# Patient Record
Sex: MALE | Race: WHITE | NOT HISPANIC OR LATINO | URBAN - METROPOLITAN AREA
[De-identification: names, ages, dates, MRNs, and addresses within clinical notes are randomized per-mention and may not be internally consistent; named-entity substitution may affect disease eponyms.]

---

## 2019-05-03 ENCOUNTER — EMERGENCY (EMERGENCY)
Facility: HOSPITAL | Age: 24
LOS: 1 days | Discharge: HOME | End: 2019-05-03
Attending: EMERGENCY MEDICINE | Admitting: EMERGENCY MEDICINE
Payer: COMMERCIAL

## 2019-05-03 VITALS
OXYGEN SATURATION: 97 % | RESPIRATION RATE: 18 BRPM | TEMPERATURE: 98 F | HEART RATE: 80 BPM | DIASTOLIC BLOOD PRESSURE: 78 MMHG | SYSTOLIC BLOOD PRESSURE: 124 MMHG

## 2019-05-03 DIAGNOSIS — M79.644 PAIN IN RIGHT FINGER(S): ICD-10-CM
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
## 2019-05-03 DIAGNOSIS — S67.192A CRUSHING INJURY OF RIGHT MIDDLE FINGER, INITIAL ENCOUNTER: ICD-10-CM
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
## 2019-05-03 PROCEDURE — 73140 X-RAY EXAM OF FINGER(S): CPT | Mod: 26,RT

## 2019-05-03 PROCEDURE — 73130 X-RAY EXAM OF HAND: CPT | Mod: 26,RT

## 2019-05-03 PROCEDURE — 99283 EMERGENCY DEPT VISIT LOW MDM: CPT | Mod: 25

## 2019-05-03 RX ORDER — CEFUROXIME AXETIL 250 MG
500 TABLET ORAL ONCE
Qty: 0 | Refills: 0 | Status: COMPLETED | OUTPATIENT
Start: 2019-05-03 | End: 2019-05-03

## 2019-05-03 RX ORDER — IBUPROFEN 200 MG
1 TABLET ORAL
Qty: 30 | Refills: 0 | OUTPATIENT
Start: 2019-05-03

## 2019-05-03 RX ORDER — IBUPROFEN 200 MG
600 TABLET ORAL ONCE
Qty: 0 | Refills: 0 | Status: COMPLETED | OUTPATIENT
Start: 2019-05-03 | End: 2019-05-03

## 2019-05-03 RX ADMIN — Medication 600 MILLIGRAM(S): at 10:53

## 2019-05-03 RX ADMIN — Medication 500 MILLIGRAM(S): at 10:53

## 2019-05-03 NOTE — ED PROVIDER NOTE - CARE PROVIDER_API CALL
Robel Abbasi)  Plastic Surgery; Surgery  [address] [place] [id number]  Phone: [phone]  Fax: [phone]  Follow Up Time:     Heath Awad)  Orthopaedic Surgery  [address] [place] [address]  Phone: [phone]  Fax: [phone]  Follow Up Time:

## 2019-05-03 NOTE — ED PROVIDER NOTE - DIAGNOSTIC INTERPRETATION
Interpreted by ED Physician:  Right Hand xray (3 views)- no fx, no jt effusion, no soft tissue swelling. Right 4th digit tuft of distal phalanx has abnormal morphology but is well corticated, which is an incidental finding.

## 2019-05-03 NOTE — ED PROVIDER NOTE - OBJECTIVE STATEMENT
22 y/o male with no significant PMHx presents to ED c/o right 3rd finger injury. Patient reports he was at the gym and a weight fell on the right 3rd finger, crushing the fingertip and tearing the nail off. States the nail was "hanging off by a thread" and he subsequently pressed it back down and wrapped it, and came to the ER for evaluation. Patient denies any other injuries or complaints. States he did not take any pain meds PTA and declined pain meds here. States tetanus is UTD.

## 2019-05-03 NOTE — ED PROVIDER NOTE - SKIN, MLM
Skin normal color for race, warm, dry and intact. No evidence of rash. Right 3rd fingertip crush and nail injury.

## 2019-05-03 NOTE — ED PROVIDER NOTE - CLINICAL SUMMARY MEDICAL DECISION MAKING FREE TEXT BOX
Patient presents with right 3rd fingertip crush and nail injury, tetanus is UTD. Will obtain x-ray to r/o fracture. Patient declined pain meds. Patient presents with right 3rd fingertip crush and nail injury, tetanus is UTD. Will obtain x-ray to r/o fracture. Patient declined pain meds. Needs hand eval.

## 2019-05-03 NOTE — ED PROVIDER NOTE - PROGRESS NOTE DETAILS
Dr. Bolden on call in in [place] and can see him Monday in office. Nail is hanging off and patient is very active athlete Dr. Abbasi of hand will see today.
